# Patient Record
(demographics unavailable — no encounter records)

---

## 2024-11-13 NOTE — HISTORY OF PRESENT ILLNESS
[FreeTextEntry1] : 58 yo male presents for outpatient follow up after presenting to the [facility] ED on 10/8/24 with scrotal pain.  A scrotal US was performed which showed no pathology.  His pain is improved over the last month.  He thinks the pain is from squeezing of the scrotum when he is in a seated position.  He denies any bothersome LUTS.  His PSA from earlier this year was WNL.  He denies a family hx of  malignancies.   ACC: [id number]     EXAM:  US SCROTUM AND CONTENTS   PROCEDURE DATE:  10/08/2024  INTERPRETATION:  CLINICAL INFORMATION: Testicular pain for weeks.  COMPARISON: None available.  TECHNIQUE: Testicular ultrasound utilizing color and spectral Doppler.  FINDINGS:  RIGHT: Right testis: 4.0 x 2.0 x 2.7 cm. Normal echogenicity and echotexture with no masses or areas of architectural distortion. Normal arterial and venous blood flow pattern. Right epididymis: Within normal limits. Right hydrocele: None. Right varicocele: Borderline varicocele. Paratesticular veins: No varicocele (<2.5mm). Borderline varicocele (2.5-2.9mm). Varicocele present (= or >3.0mm) Supine without valsalva: 2.6 mm Supine with valsalva: 2.8 mm  LEFT: Left testis: 3.9 x 1.9x 2.7 cm. Normal echogenicity and echotexture with no masses or areas of architectural distortion. Scattered punctate echogenic foci, suggestive of testicular microlithiasis. Normal arterial and venous blood flow pattern. Left epididymis: Within normal limits. Left hydrocele: None. Left varicocele: Borderline varicocele. Paratesticular veins: No varicocele (<2.5mm). Borderline varicocele (2.5-2.9mm). Varicocele present (= or >3.0mm) Supine without valsalva: 2.7 mm Supine with valsalva: 2.8 mm  IMPRESSION:  No acute scrotal abnormality.  PSA (2/21/24) = 0.34

## 2024-11-13 NOTE — PHYSICAL EXAM
[Normal Appearance] : normal appearance [General Appearance - In No Acute Distress] : no acute distress [Heart Rate And Rhythm] : heart rate and rhythm were normal [] : no respiratory distress [Abdomen Soft] : soft [Abdomen Tenderness] : non-tender [Abdomen Hernia] : no hernia was discovered [Urethral Meatus] : meatus normal [Penis Abnormality] : normal uncircumcised penis [Epididymis] : the epididymides were normal [Testes Tenderness] : no tenderness of the testes [Testes Mass (___cm)] : there were no testicular masses [Prostate Tenderness] : the prostate was not tender [No Prostate Nodules] : no prostate nodules [Prostate Size ___ (0-4)] : prostate size [unfilled] (scale: 0-4) [Normal Station and Gait] : the gait and station were normal for the patient's age [Skin Color & Pigmentation] : normal skin color and pigmentation [No Focal Deficits] : no focal deficits [Oriented To Time, Place, And Person] : oriented to person, place, and time [Not Anxious] : not anxious

## 2024-11-13 NOTE — ASSESSMENT
[FreeTextEntry1] : 60 yo male with now resolved scrotal pain which is likely from positioning of the scrotum while seated.  I advised him to use a scrotal "bump" while seated.  He can also use NSAIDs PRN for discomfort. His PSAs are very low and WNL.  He can continue with PSA screening every 1-2 years.

## 2025-02-13 NOTE — DISCUSSION/SUMMARY
[FreeTextEntry1] : Borderline HTN - OBINNA  and I had an extensive discussion regarding his blood pressure management. Patient will continue taking current medications in addition to maintaining a low Na diet, with periodic b/p checks at home. SOB check echo and carotid us if able to approve and schedule.

## 2025-02-13 NOTE — REASON FOR VISIT
[Symptom and Test Evaluation] : symptom and test evaluation [FreeTextEntry1] : 58M comes in for a follow up and re-evaluation. No chest pain noted. He admits to feeling anxious about his health. Notes occasional leg fatigue. Lipids and HgA1C have been abnormal. However, he is working on diet and exercise.

## 2025-05-06 NOTE — REASON FOR VISIT
[Symptom and Test Evaluation] : symptom and test evaluation [FreeTextEntry1] : 59M comes in for a follow up visit. He is a very nervous man, who has been seen with a number of complains over the last few years. However, more recently, he was seen in ER secondary to palpitations and noted to be in new onset AF.  He remarks on palpitations on occasion. Metoprolol and eliquis started. He is super nervous about the new diagnosis. He does admit to inc cocktails lately.

## 2025-05-06 NOTE — DISCUSSION/SUMMARY
[FreeTextEntry1] : pAF check holter monitor, refill meds, EPS follow up for DCCV EKG AF no st t changes echo and carotid pending.

## 2025-05-09 NOTE — DISCUSSION/SUMMARY
[FreeTextEntry1] : 58 y/o M, with PMHX of pre-dm, anxiety, htn, who presents for evaluation of new-onset atrial fibrillation.   1) Atrial Fibrillation  Patient admits to palpitations that started on 5/2/25, was started on lopressor 25 mg BID and eliquis 5 mg daily. He is currently rate controlled. We discussed in detail the normal conduction system of the heart and what atrial fibrillation is.  We discussed the natural progression of this arrhythmia in the context of any existing comorbidities.  We also discussed the association between atrial fibrillation and thrombotic events / stroke.  We discussed treatment options for paroxysmal atrial fibrillation including a rate control strategy vs. rhythm control with antiarrhythmic medications or an ablation.  The patent was counseled on the fact that there is no cure for atrial fibrillation and that for long term control of atrial fibrillation a combination of strategies if often used.  Regardless of treatment options and maintenance of sinus rhythm, anticoagulation is still recommended based on CHADSVASC score.  Success in rhythm control management is best defined as improved quality of life, maintenance of sinus rhythm and reduced hospitalization since not all patients have symptoms to diagnose sub-clinical episodes.  We discussed that an ablation results in better long-term outcomes compared to medical therapy alone but repeat procedures and/or addition of medications may be required even after an ablation. At this time, we will proceed with a LUC and a DCCV. We will schedule him at [facility].  He should continue lopressor 25 mg BID for rate control  2) Stroke Risk  Patient to undergo LUC prior to DCCV Continue eliquis 5 mg twice daily   3) Risk Factors  We discussed that the risk of afib increases as you age. However, we discussed lifestyle modifications such as decrease in caffeine, alcohol, blood pressure, and weight.  We will refer him to sleep study.   Patient agrees to the above treatment and plan. We will schedule him ASAP for a LUC/DCCV. He knows to call prior if he has any questions or concerns. [EKG obtained to assist in diagnosis and management of assessed problem(s)] : EKG obtained to assist in diagnosis and management of assessed problem(s)

## 2025-05-09 NOTE — DISCUSSION/SUMMARY
[FreeTextEntry1] : 58 y/o M, with PMHX of pre-dm, anxiety, htn, who presents for evaluation of new-onset atrial fibrillation.   1) Atrial Fibrillation  Patient admits to palpitations that started on 5/2/25, was started on lopressor 25 mg BID and eliquis 5 mg daily. He is currently rate controlled. We discussed in detail the normal conduction system of the heart and what atrial fibrillation is.  We discussed the natural progression of this arrhythmia in the context of any existing comorbidities.  We also discussed the association between atrial fibrillation and thrombotic events / stroke.  We discussed treatment options for paroxysmal atrial fibrillation including a rate control strategy vs. rhythm control with antiarrhythmic medications or an ablation.  The patent was counseled on the fact that there is no cure for atrial fibrillation and that for long term control of atrial fibrillation a combination of strategies if often used.  Regardless of treatment options and maintenance of sinus rhythm, anticoagulation is still recommended based on CHADSVASC score.  Success in rhythm control management is best defined as improved quality of life, maintenance of sinus rhythm and reduced hospitalization since not all patients have symptoms to diagnose sub-clinical episodes.  We discussed that an ablation results in better long-term outcomes compared to medical therapy alone but repeat procedures and/or addition of medications may be required even after an ablation. At this time, we will proceed with a LUC and a DCCV. We will schedule him at [facility].  He should continue lopressor 25 mg BID for rate control  2) Stroke Risk  Patient to undergo LUC prior to DCCV Continue eliquis 5 mg twice daily   3) Risk Factors  We discussed that the risk of afib increases as you age. However, we discussed lifestyle modifications such as decrease in caffeine, alcohol, blood pressure, and weight.  We will refer him to sleep study.   Patient agrees to the above treatment and plan. We will schedule him ASAP for a LUC/DCCV. He knows to call prior if he has any questions or concerns. [EKG obtained to assist in diagnosis and management of assessed problem(s)] : EKG obtained to assist in diagnosis and management of assessed problem(s)

## 2025-05-09 NOTE — PHYSICAL EXAM
[Well Developed] : well developed [Well Nourished] : well nourished [No Acute Distress] : no acute distress [Normal Conjunctiva] : normal conjunctiva [Normal Venous Pressure] : normal venous pressure [No Carotid Bruit] : no carotid bruit [Normal S1, S2] : normal S1, S2 [Clear Lung Fields] : clear lung fields [Good Air Entry] : good air entry [No Respiratory Distress] : no respiratory distress  [Soft] : abdomen soft [Non Tender] : non-tender [No Masses/organomegaly] : no masses/organomegaly [Normal Bowel Sounds] : normal bowel sounds [Normal Gait] : normal gait [No Edema] : no edema [No Cyanosis] : no cyanosis [No Clubbing] : no clubbing [No Varicosities] : no varicosities [No Rash] : no rash [No Skin Lesions] : no skin lesions [Moves all extremities] : moves all extremities [No Focal Deficits] : no focal deficits [Normal Speech] : normal speech [Alert and Oriented] : alert and oriented [Normal memory] : normal memory [de-identified] : +irregularly, irregular

## 2025-05-09 NOTE — PHYSICAL EXAM
[Well Developed] : well developed [Well Nourished] : well nourished [No Acute Distress] : no acute distress [Normal Conjunctiva] : normal conjunctiva [Normal Venous Pressure] : normal venous pressure [No Carotid Bruit] : no carotid bruit [Normal S1, S2] : normal S1, S2 [Clear Lung Fields] : clear lung fields [Good Air Entry] : good air entry [No Respiratory Distress] : no respiratory distress  [Soft] : abdomen soft [Non Tender] : non-tender [No Masses/organomegaly] : no masses/organomegaly [Normal Bowel Sounds] : normal bowel sounds [Normal Gait] : normal gait [No Edema] : no edema [No Cyanosis] : no cyanosis [No Clubbing] : no clubbing [No Varicosities] : no varicosities [No Rash] : no rash [No Skin Lesions] : no skin lesions [Moves all extremities] : moves all extremities [No Focal Deficits] : no focal deficits [Normal Speech] : normal speech [Alert and Oriented] : alert and oriented [Normal memory] : normal memory [de-identified] : +irregularly, irregular

## 2025-05-09 NOTE — CARDIOLOGY SUMMARY
[de-identified] : 5/9/25: atrial fibrillation @ 106 bpm  [de-identified] : ECHO 5/9/25: 1. Left ventricular cavity is normal in size. Left ventricular wall thickness is mildly increased. Left ventricular systolic function is normal with an ejection fraction of 66 % by Herrera's method of disks. There are no regional wall motion abnormalities seen. 2. Normal left ventricular diastolic function, with normal filling pressure. 3. Normal right ventricular cavity size, with normal wall thickness, and normal systolic function. 4. Mild mitral regurgitation. 5. Normal left and right atrial size. 6. No significant valvular disease. 7. No pericardial effusion seen. 8. Mild left ventricular hypertrophy.

## 2025-05-09 NOTE — HISTORY OF PRESENT ILLNESS
[FreeTextEntry1] : 58 y/o M, with PMHx of pre-diabetes, anxiety, hth, who presents for initial evaluation of atrial fibrillation. 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 On 5/25/25, he woke up in the middle of the night feeling as if his heart was thumping. He states he has never had this before. He went to a PCP in [place] who told him he was anxious and did an EKG and was told he was in atrial fibrillation. He then went to [facility] and was found to be in atrial fibrillation. His metoprolol was increased to Lopressor 50 mg BID and started on Eliquis. He feels anxious. He admits to drinking a lot of EtOH the night before he went into atrial fibrillation.  He admits to snoring.  He is currently wearing a [employer] monitor.

## 2025-05-09 NOTE — ADDENDUM
[FreeTextEntry1] :   I, Shelli Tovar, am scribing for and the presence of Dr. Julian Barcenas the following sections: HPI, PMH,Family/social history, ROS, Physical Exam, Assessment / Plan.  I, Julian Barcenas, hereby attest that the medical record entry for this patient accurately reflects signatures/notations that I made on the Date of Service in my capacity as an Attending Physician when I treated/diagnosed the above patient. I do hereby attest that this information is true, accurate and complete to the best of my knowledge.  I was present for the entire visit and supervised the entire visit including assessing the patient, conducting exam and establishing the plan of care.  I personally performed the evaluation and management services and agree with the plan as outlined above.

## 2025-05-09 NOTE — HISTORY OF PRESENT ILLNESS
[FreeTextEntry1] : 58 y/o M, with PMHx of pre-diabetes, anxiety, hth, who presents for initial evaluation of atrial fibrillation. 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 On 5/25/25, he woke up in the middle of the night feeling as if his heart was thumping. He states he has never had this before. He went to a PCP in [place] who told him he was anxious and did an EKG and was told he was in atrial fibrillation. He then went to [facility] and was found to be in atrial fibrillation. His metoprolol was increased to Lopressor 50 mg BID and started on Eliquis. He feels anxious. He admits to drinking a lot of EtOH the night before he went into atrial fibrillation.  He admits to snoring.  He is currently wearing a [employer] monitor.

## 2025-05-09 NOTE — CARDIOLOGY SUMMARY
[de-identified] : 5/9/25: atrial fibrillation @ 106 bpm  [de-identified] : ECHO 5/9/25: 1. Left ventricular cavity is normal in size. Left ventricular wall thickness is mildly increased. Left ventricular systolic function is normal with an ejection fraction of 66 % by Herrera's method of disks. There are no regional wall motion abnormalities seen. 2. Normal left ventricular diastolic function, with normal filling pressure. 3. Normal right ventricular cavity size, with normal wall thickness, and normal systolic function. 4. Mild mitral regurgitation. 5. Normal left and right atrial size. 6. No significant valvular disease. 7. No pericardial effusion seen. 8. Mild left ventricular hypertrophy.

## 2025-06-06 NOTE — HISTORY OF PRESENT ILLNESS
[FreeTextEntry1] : 60 y/o M, with PMHx of pre-diabetes, anxiety, htn, who presents for follow-up of paroxysmal atrial fibrillation. Patient is now s/p LUC/DCCV on 05/21/25. He has been maintaining NSR. He states that he sometimes feels pain on the left side of his chest. He states the pain is intermittent.

## 2025-06-06 NOTE — DISCUSSION/SUMMARY
[EKG obtained to assist in diagnosis and management of assessed problem(s)] : EKG obtained to assist in diagnosis and management of assessed problem(s) [FreeTextEntry1] : 58 y/o M, with PMHX of pre-dm, anxiety, htn, who presents for follow-up of atrial fibrillation. He is s/p LUC/DCCV on 5/21/25.   1) Atrial Fibrillation  He is maintaining NSR. He should continue lopressor 25 mg twice daily.  We discussed in detail the normal conduction system of the heart and what atrial fibrillation is.  We discussed the natural progression of this arrhythmia in the context of any existing comorbidities.  We also discussed the association between atrial fibrillation and thrombotic events / stroke.  We discussed treatment options for paroxysmal atrial fibrillation including a rate control strategy vs. rhythm control with antiarrhythmic medications or an ablation.  The patent was counseled on the fact that there is no cure for atrial fibrillation and that for long term control of atrial fibrillation a combination of strategies if often used.  Regardless of treatment options and maintenance of sinus rhythm, anticoagulation is still recommended based on CHADSVASC score.  Success in rhythm control management is best defined as improved quality of life, maintenance of sinus rhythm and reduced hospitalization since not all patients have symptoms to diagnose sub-clinical episodes.  We discussed that an ablation results in better long-term outcomes compared to medical therapy alone but repeat procedures and/or addition of medications may be required even after an ablation. He is interested in an ablation. We will schedule him for procedure in the upcoming months.   2) Stroke Risk  Patient to undergo LUC prior to DCCV Continue eliquis 5 mg twice daily   3) Risk Factors  We discussed that the risk of afib increases as you age. However, we discussed lifestyle modifications such as decrease in caffeine, alcohol, blood pressure, and weight.  We will refer him to sleep study.   Patient agrees to the above treatment and plan. We will reach out to schedule ablation.

## 2025-06-06 NOTE — DISCUSSION/SUMMARY
[EKG obtained to assist in diagnosis and management of assessed problem(s)] : EKG obtained to assist in diagnosis and management of assessed problem(s) [FreeTextEntry1] : 60 y/o M, with PMHX of pre-dm, anxiety, htn, who presents for follow-up of atrial fibrillation. He is s/p LUC/DCCV on 5/21/25.   1) Atrial Fibrillation  He is maintaining NSR. He should continue lopressor 25 mg twice daily.  We discussed in detail the normal conduction system of the heart and what atrial fibrillation is.  We discussed the natural progression of this arrhythmia in the context of any existing comorbidities.  We also discussed the association between atrial fibrillation and thrombotic events / stroke.  We discussed treatment options for paroxysmal atrial fibrillation including a rate control strategy vs. rhythm control with antiarrhythmic medications or an ablation.  The patent was counseled on the fact that there is no cure for atrial fibrillation and that for long term control of atrial fibrillation a combination of strategies if often used.  Regardless of treatment options and maintenance of sinus rhythm, anticoagulation is still recommended based on CHADSVASC score.  Success in rhythm control management is best defined as improved quality of life, maintenance of sinus rhythm and reduced hospitalization since not all patients have symptoms to diagnose sub-clinical episodes.  We discussed that an ablation results in better long-term outcomes compared to medical therapy alone but repeat procedures and/or addition of medications may be required even after an ablation. He is interested in an ablation. We will schedule him for procedure in the upcoming months.   2) Stroke Risk  Patient to undergo LUC prior to DCCV Continue eliquis 5 mg twice daily   3) Risk Factors  We discussed that the risk of afib increases as you age. However, we discussed lifestyle modifications such as decrease in caffeine, alcohol, blood pressure, and weight.  We will refer him to sleep study.   Patient agrees to the above treatment and plan. We will reach out to schedule ablation.

## 2025-06-06 NOTE — PHYSICAL EXAM
[Well Developed] : well developed [Well Nourished] : well nourished [No Acute Distress] : no acute distress [Normal Conjunctiva] : normal conjunctiva [Normal Venous Pressure] : normal venous pressure [No Carotid Bruit] : no carotid bruit [Normal S1, S2] : normal S1, S2 [Clear Lung Fields] : clear lung fields [Good Air Entry] : good air entry [No Respiratory Distress] : no respiratory distress  [Soft] : abdomen soft [Non Tender] : non-tender [No Masses/organomegaly] : no masses/organomegaly [Normal Bowel Sounds] : normal bowel sounds [Normal Gait] : normal gait [No Edema] : no edema [No Cyanosis] : no cyanosis [No Clubbing] : no clubbing [No Varicosities] : no varicosities [No Rash] : no rash [No Skin Lesions] : no skin lesions [Moves all extremities] : moves all extremities [No Focal Deficits] : no focal deficits [Normal Speech] : normal speech [Alert and Oriented] : alert and oriented [Normal memory] : normal memory [de-identified] : +irregularly, irregular

## 2025-06-06 NOTE — CARDIOLOGY SUMMARY
[de-identified] : 6/6/25: NSR @ 73 bpm  5/9/25: atrial fibrillation @ 106 bpm  [de-identified] : ECHO 5/9/25: 1. Left ventricular cavity is normal in size. Left ventricular wall thickness is mildly increased. Left ventricular systolic function is normal with an ejection fraction of 66 % by Herrera's method of disks. There are no regional wall motion abnormalities seen. 2. Normal left ventricular diastolic function, with normal filling pressure. 3. Normal right ventricular cavity size, with normal wall thickness, and normal systolic function. 4. Mild mitral regurgitation. 5. Normal left and right atrial size. 6. No significant valvular disease. 7. No pericardial effusion seen. 8. Mild left ventricular hypertrophy.

## 2025-06-06 NOTE — PHYSICAL EXAM
[Well Developed] : well developed [Well Nourished] : well nourished [No Acute Distress] : no acute distress [Normal Conjunctiva] : normal conjunctiva [Normal Venous Pressure] : normal venous pressure [No Carotid Bruit] : no carotid bruit [Normal S1, S2] : normal S1, S2 [Clear Lung Fields] : clear lung fields [Good Air Entry] : good air entry [No Respiratory Distress] : no respiratory distress  [Soft] : abdomen soft [Non Tender] : non-tender [No Masses/organomegaly] : no masses/organomegaly [Normal Bowel Sounds] : normal bowel sounds [Normal Gait] : normal gait [No Edema] : no edema [No Cyanosis] : no cyanosis [No Clubbing] : no clubbing [No Varicosities] : no varicosities [No Rash] : no rash [No Skin Lesions] : no skin lesions [Moves all extremities] : moves all extremities [No Focal Deficits] : no focal deficits [Normal Speech] : normal speech [Alert and Oriented] : alert and oriented [Normal memory] : normal memory [de-identified] : +irregularly, irregular

## 2025-06-06 NOTE — HISTORY OF PRESENT ILLNESS
[FreeTextEntry1] : 58 y/o M, with PMHx of pre-diabetes, anxiety, htn, who presents for follow-up of paroxysmal atrial fibrillation. Patient is now s/p LUC/DCCV on 05/21/25. He has been maintaining NSR. He states that he sometimes feels pain on the left side of his chest. He states the pain is intermittent.

## 2025-06-06 NOTE — CARDIOLOGY SUMMARY
[de-identified] : 6/6/25: NSR @ 73 bpm  5/9/25: atrial fibrillation @ 106 bpm  [de-identified] : ECHO 5/9/25: 1. Left ventricular cavity is normal in size. Left ventricular wall thickness is mildly increased. Left ventricular systolic function is normal with an ejection fraction of 66 % by Herrera's method of disks. There are no regional wall motion abnormalities seen. 2. Normal left ventricular diastolic function, with normal filling pressure. 3. Normal right ventricular cavity size, with normal wall thickness, and normal systolic function. 4. Mild mitral regurgitation. 5. Normal left and right atrial size. 6. No significant valvular disease. 7. No pericardial effusion seen. 8. Mild left ventricular hypertrophy.